# Patient Record
Sex: FEMALE | Race: OTHER
[De-identification: names, ages, dates, MRNs, and addresses within clinical notes are randomized per-mention and may not be internally consistent; named-entity substitution may affect disease eponyms.]

---

## 2018-09-11 ENCOUNTER — HOSPITAL ENCOUNTER (EMERGENCY)
Dept: HOSPITAL 74 - JERFT | Age: 38
Discharge: HOME | End: 2018-09-11
Payer: COMMERCIAL

## 2018-09-11 VITALS — TEMPERATURE: 98 F | SYSTOLIC BLOOD PRESSURE: 98 MMHG | HEART RATE: 61 BPM | DIASTOLIC BLOOD PRESSURE: 61 MMHG

## 2018-09-11 VITALS — BODY MASS INDEX: 25.1 KG/M2
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
## 2018-09-11 DIAGNOSIS — S39.012A: Primary | ICD-10-CM
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
## 2018-09-11 NOTE — PDOC
History of Present Illness





- General


Chief Complaint: Back Pain


Stated Complaint: FALL, PAIN IN LOWER BACK


Time Seen by Provider: 09/11/18 12:59





- History of Present Illness


Initial Comments: 


38-year-old female with lower back pain after a slip on wet leaves this 

morning. She states she fell directly onto her buttocks. She complains of lower 

back pain. She denies radicular symptoms or loss of bowel bladder function.


09/11/18 14:43








Past History





- Past Medical History


Allergies/Adverse Reactions: 


 Allergies











Allergy/AdvReac Type Severity Reaction Status Date / Time


 


No Known Allergies Allergy   Verified 09/11/18 12:36











Home Medications: 


Ambulatory Orders





Ranitidine [Zantac -] 150 mg PO DAILY #10 tablet 02/14/16 


Cyclobenzaprine HCl [Flexeril 10 mg] 10 mg PO HS PRN #10 tablet 09/11/18 


Ibuprofen [Motrin -] 600 mg PO TID #30 tablet 09/11/18 











- Surgical History


Cholecystectomy: Yes





- Immunization History


Immunization Up to Date: Yes





- Suicide/Smoking/Psychosocial Hx


Smoking History: Never smoked


Have you smoked in the past 12 months: No


Information on smoking cessation initiated: No


Hx Alcohol Use: No


Drug/Substance Use Hx: No


Substance Use Type: None





**Review of Systems





- Review of Systems


Musculoskeletal: Yes: Back Pain


All Other Systems: Reviewed and Negative





*Physical Exam





- Vital Signs


 Last Vital Signs











Temp Pulse Resp BP Pulse Ox


 


 98.0 F   61   16   98/61   99 


 


 09/11/18 12:33  09/11/18 12:33  09/11/18 12:33  09/11/18 12:33  09/11/18 12:33














- Physical Exam


Comments: 


Lumbar spine skin color and temperature are normal there is mild to moderate 

left-sided paralumbar musculature spasm and tenderness. 5 out of 5 strength in 

bilateral lower extremities negative straight leg raise test thighs and calves 

are soft and nontender she has no gross sensorimotor deficits she is 

neurovascularly intact.


09/11/18 14:47








ED Treatment Course





- ADDITIONAL ORDERS


Additional order review: 


 Laboratory  Results











  09/11/18





  13:06


 


Urine HCG, Qual  Negative














- RADIOLOGY


Radiology Studies Ordered: 














 Category Date Time Status


 


 SPINE-LUMBAR SACRAL [RAD] Stat Radiology  09/11/18 14:22 Taken














- Medications


Given in the ED: 


ED Medications














Discontinued Medications














Generic Name Dose Route Start Last Admin





  Trade Name Freq  PRN Reason Stop Dose Admin


 


Ketorolac Tromethamine  60 mg  09/11/18 14:37  09/11/18 14:43





  Toradol Injection -  IM  09/11/18 14:38  60 mg





  ONCE ONE   Administration





     





     





     





     














*DC/Admit/Observation/Transfer


Diagnosis at time of Disposition: 


 Lumbar strain








- Discharge Dispostion


Disposition: HOME


Condition at time of disposition: Improved


Decision to Admit order: No





- Referrals


Referrals: 


Mary Wright MD [Primary Care Provider] - 


Missael Keller MD [Staff Physician] - 





- Patient Instructions


Printed Discharge Instructions:  Low Back Pain, DI for Low Back Pain


Additional Instructions: 


Return to the emergency room should symptoms worsen or go unresolved. Follow-up 

with spine surgery in 2-3 days for further evaluation and treatment options. 

Take the anti-inflammatory medication as directed 3 times a day with food that 

one tablet at a time. Discontinue the medication of bothers her stomach. The 

muscle relaxer will make you sleepy 20 tablet before bedtime.





- Post Discharge Activity

## 2018-10-28 ENCOUNTER — HOSPITAL ENCOUNTER (EMERGENCY)
Dept: HOSPITAL 74 - JER | Age: 38
Discharge: HOME | End: 2018-10-28
Payer: COMMERCIAL

## 2018-10-28 VITALS — TEMPERATURE: 98.4 F | HEART RATE: 60 BPM | SYSTOLIC BLOOD PRESSURE: 103 MMHG | DIASTOLIC BLOOD PRESSURE: 62 MMHG

## 2018-10-28 VITALS — BODY MASS INDEX: 24.7 KG/M2

## 2018-10-28 DIAGNOSIS — Z3A.01: ICD-10-CM

## 2018-10-28 DIAGNOSIS — O20.8: ICD-10-CM

## 2018-10-28 DIAGNOSIS — O26.891: Primary | ICD-10-CM

## 2018-10-28 LAB
ALBUMIN SERPL-MCNC: 3.8 G/DL (ref 3.4–5)
ALP SERPL-CCNC: 84 U/L (ref 45–117)
ALT SERPL-CCNC: 41 U/L (ref 13–61)
ANION GAP SERPL CALC-SCNC: 10 MMOL/L (ref 8–16)
APPEARANCE UR: CLEAR
AST SERPL-CCNC: 18 U/L (ref 15–37)
BASOPHILS # BLD: 0.4 % (ref 0–2)
BILIRUB SERPL-MCNC: 0.5 MG/DL (ref 0.2–1)
BILIRUB UR STRIP.AUTO-MCNC: NEGATIVE MG/DL
BUN SERPL-MCNC: 12 MG/DL (ref 7–18)
CALCIUM SERPL-MCNC: 8.3 MG/DL (ref 8.5–10.1)
CHLORIDE SERPL-SCNC: 109 MMOL/L (ref 98–107)
CO2 SERPL-SCNC: 25 MMOL/L (ref 21–32)
COLOR UR: YELLOW
CREAT SERPL-MCNC: 0.5 MG/DL (ref 0.55–1.3)
DEPRECATED RDW RBC AUTO: 13 % (ref 11.6–15.6)
EOSINOPHIL # BLD: 1.4 % (ref 0–4.5)
EPITH CASTS URNS QL MICRO: (no result) /HPF
GLUCOSE SERPL-MCNC: 94 MG/DL (ref 74–106)
HCT VFR BLD CALC: 38.3 % (ref 32.4–45.2)
HGB BLD-MCNC: 12.9 GM/DL (ref 10.7–15.3)
KETONES UR QL STRIP: NEGATIVE
LEUKOCYTE ESTERASE UR QL STRIP.AUTO: NEGATIVE
LYMPHOCYTES # BLD: 27.4 % (ref 8–40)
MCH RBC QN AUTO: 31.5 PG (ref 25.7–33.7)
MCHC RBC AUTO-ENTMCNC: 33.8 G/DL (ref 32–36)
MCV RBC: 93.1 FL (ref 80–96)
MONOCYTES # BLD AUTO: 7.6 % (ref 3.8–10.2)
MUCOUS THREADS URNS QL MICRO: (no result)
NEUTROPHILS # BLD: 63.2 % (ref 42.8–82.8)
NITRITE UR QL STRIP: NEGATIVE
PH UR: 5 [PH] (ref 5–8)
PLATELET # BLD AUTO: 285 K/MM3 (ref 134–434)
PMV BLD: 6.9 FL (ref 7.5–11.1)
POTASSIUM SERPLBLD-SCNC: 3.5 MMOL/L (ref 3.5–5.1)
PROT SERPL-MCNC: 6.8 G/DL (ref 6.4–8.2)
PROT UR QL STRIP: NEGATIVE
PROT UR QL STRIP: NEGATIVE
RBC # BLD AUTO: 4.11 M/MM3 (ref 3.6–5.2)
SODIUM SERPL-SCNC: 144 MMOL/L (ref 136–145)
SP GR UR: 1.01 (ref 1.01–1.03)
UROBILINOGEN UR STRIP-MCNC: NEGATIVE MG/DL (ref 0.2–1)
WBC # BLD AUTO: 6.7 K/MM3 (ref 4–10)

## 2018-10-28 NOTE — PDOC
History of Present Illness





- General


Chief Complaint: Vaginal Bleeding


Stated Complaint: 5 WKS PREGNANT/ VAGINAL BLEEDING


Time Seen by Provider: 10/28/18 08:58


History Source: Patient


Exam Limitations: No Limitations





- History of Present Illness


Initial Comments: 





10/28/18 10:14


37 y/o female  approx 5 weeks pregnant presents to the ED with complaints 

of vaginal bleeding upon urination this morning. The patient also complains of 

mild abdominal cramping for ther past week and has her 1st ultrasound with her 

ob /gyn tomorrow. The patient denies nausea, fever, back pain, or urinary 

complaints.


Timing/Duration: reports: constant


Quality: reports: mild, cramping


Abdominal Pain Onset Location: reports: suprapubic (mid)


Pain Radiation: reports: no radiation


Activities at Onset: reports: none


Aggravating Factors: improves with: None


Alleviating Factors: improves with: None





Past History





- Travel


Traveled outside of the country in the last 30 days: No





- Past Medical History


Allergies/Adverse Reactions: 


 Allergies











Allergy/AdvReac Type Severity Reaction Status Date / Time


 


No Known Allergies Allergy   Verified 10/28/18 08:53











Home Medications: 


Ambulatory Orders





Prenatal One Tablet 1 tab PO DAILY 10/28/18 








COPD: No





- Surgical History


Cholecystectomy: Yes





- Immunization History


Immunization Up to Date: Yes





- Suicide/Smoking/Psychosocial Hx


Smoking History: Never smoked


Have you smoked in the past 12 months: No


Hx Alcohol Use: No


Drug/Substance Use Hx: No


Substance Use Type: None


Patient Lives Alone: No


Lives with/in: spouse/SO





**Review of Systems





- Review of Systems


Able to Perform ROS?: No


Constitutional: No: Symptoms Reported


HEENTM: No: Symptoms Reported


Respiratory: No: Symptoms reported


Cardiac (ROS): No: Symptoms Reported


ABD/GI: Yes: Abdominal cramping


: Yes: Discharge


Musculoskeletal: No: Symptoms Reported


Integumentary: No: Symptoms Reported


Neurological: No: Symptoms reported


Endocrine: No: Symptoms Reported





*Physical Exam





- Vital Signs


 Last Vital Signs











Temp Pulse Resp BP Pulse Ox


 


 98.4 F   60   18   103/62   99 


 


 10/28/18 08:50  10/28/18 08:50  10/28/18 08:50  10/28/18 08:50  10/28/18 08:50














- Physical Exam


General Appearance: Yes: Nourished, Appropriately Dressed.  No: Apparent 

Distress


HEENT: negative: Pale Conjunctivae


Neck: positive: Normal Thyroid


Respiratory/Chest: positive: Lungs Clear, Normal Breath Sounds.  negative: 

Respiratory Distress, Accessory Muscle Use


Cardiovascular: positive: Regular Rhythm, Regular Rate.  negative: Murmur


Female Pelvic Exam: positive: cervical os closed, vaginal bleeding (bright red 

blod no clots).  negative: adnexal tenderness


Gastrointestinal/Abdominal: positive: Soft, Tenderness (mid suprapubic and 

right suprapubic)


Musculoskeletal: negative: CVA Tenderness


Extremity: positive: Normal Capillary Refill.  negative: Pedal Edema


Integumentary: positive: Normal Color, Warm, Moist


Neurologic: positive: Motor Strength 5/5 (ambulatory)





ED Treatment Course





- LABORATORY


CBC & Chemistry Diagram: 


 10/28/18 09:16





 10/28/18 09:16





Medical Decision Making





- Medical Decision Making





10/28/18 10:00


CC: vag bleeding , 5 weeks pregnant, no confirmed IUP, no hx of ectopic, SAB


Exam: mid/right  suprapubic tenderness with BRB in vault


Plan: Labs, urine, u/s, offered tylenol but refused


10/28/18 10:33


 Laboratory Tests











  10/28/18 10/28/18 10/28/18





  09:16 09:16 09:16


 


WBC  6.7  


 


Hgb  12.9  


 


Hct  38.3  


 


Sodium    144


 


Potassium    3.5


 


Chloride    109 H


 


Carbon Dioxide    25


 


Anion Gap    10


 


BUN    12


 


Creatinine    0.5 L


 


Random Glucose    94


 


Calcium    8.3 L


 


Total Bilirubin    0.5


 


AST    18


 


ALT    41


 


Alkaline Phosphatase    84


 


Total Protein    6.8


 


Albumin    3.8


 


Beta HCG, Quant    190.1


 


Urine Blood   3+ H 


 


Urine Nitrite   Negative 


 


Ur Leukocyte Esterase   Negative 


 


Urine WBC (Auto)   48 


 


Urine RBC (Auto)   59 


 


Blood Type   


 


Antibody Screen   














  10/28/18





  09:16


 


WBC 


 


Hgb 


 


Hct 


 


Sodium 


 


Potassium 


 


Chloride 


 


Carbon Dioxide 


 


Anion Gap 


 


BUN 


 


Creatinine 


 


Random Glucose 


 


Calcium 


 


Total Bilirubin 


 


AST 


 


ALT 


 


Alkaline Phosphatase 


 


Total Protein 


 


Albumin 


 


Beta HCG, Quant 


 


Urine Blood 


 


Urine Nitrite 


 


Ur Leukocyte Esterase 


 


Urine WBC (Auto) 


 


Urine RBC (Auto) 


 


Blood Type  Pending


 


Antibody Screen  Pending





 Laboratory Tests











  10/28/18 10/28/18





  09:16 10:40


 


Blood Type  O POSITIVE  O POSITIVE


 


Antibody Screen  Negative 








Ultrasound showed no iup or adnexal masses. Recommend repeat labs/ ultrasound 

for correlation. 





10/28/18 12:15








*DC/Admit/Observation/Transfer


Diagnosis at time of Disposition: 


 Vaginal bleeding in pregnancy








- Discharge Dispostion


Disposition: HOME


Condition at time of disposition: Good





- Referrals


Referrals: 


Mary Wright MD [Primary Care Provider] - 





- Patient Instructions


Printed Discharge Instructions:  DI for Vaginal Bleeding During Pregnancy


Additional Instructions: 


Please follow up with your ob/gyn in 2 days or return here for repeat beta hcg 

and ultrasound. 


May take tylenol 650mg for cramping





- Post Discharge Activity

## 2019-04-22 ENCOUNTER — HOSPITAL ENCOUNTER (EMERGENCY)
Dept: HOSPITAL 74 - JERFT | Age: 39
Discharge: HOME | End: 2019-04-22
Payer: COMMERCIAL

## 2019-04-22 VITALS — HEART RATE: 65 BPM | SYSTOLIC BLOOD PRESSURE: 110 MMHG | TEMPERATURE: 98.1 F | DIASTOLIC BLOOD PRESSURE: 69 MMHG

## 2019-04-22 VITALS — BODY MASS INDEX: 26.4 KG/M2

## 2019-04-22 DIAGNOSIS — M72.2: Primary | ICD-10-CM

## 2019-04-22 NOTE — PDOC
Rapid Medical Evaluation


Chief Complaint: Pain, Acute


Time Seen by Provider: 04/22/19 15:55


Medical Evaluation: 


 Allergies











Allergy/AdvReac Type Severity Reaction Status Date / Time


 


No Known Allergies Allergy   Verified 04/22/19 15:51








Vital Signs











Temp Pulse Resp BP Pulse Ox


 


 98.1 F   65   18   110/69   98 


 


 04/22/19 15:51  04/22/19 15:51  04/22/19 15:51  04/22/19 15:51  04/22/19 15:51








04/22/19 15:56








I have performed a brief in-person evaluation of this patient.





The patient presents with a chief complaint of:L foot pain x 1 week. No trauma. 

Denies pmhx





Pertinent physical exam findings:deferred to ED provider





I have ordered the following:nothing





The patient will proceed to the ED for further evaluation.








**Discharge Disposition





- Diagnosis


 Foot pain, left








- Referrals





- Patient Instructions





- Post Discharge Activity

## 2019-04-22 NOTE — PDOC
History of Present Illness





- General


Chief Complaint: Pain, Acute


Stated Complaint: LT. FOOT PAIN


Time Seen by Provider: 19 15:55


History Source: Patient


Exam Limitations: No Limitations





- History of Present Illness


Initial Comments: 





19 16:36


Patient here with complaints of approximately one week of left foot pain. 

States onset was when she got out of bed last week and had tenderness to the 

bottom/sole of her foot. Denies any recent trauma, denies any changes in foot 

wear, works as a  therefore performing heavy lifting and lots of 

walking frequently. Took Motrin with minimal resolved.


19 16:41





Occurred: reports: last week


Severity: reports: mild, moderate


Pain Location: reports: lower extremity (left foot, plantar aspect)


Modifying Factors: improves with: cold therapy


Associated Symptoms (Fall): denies symptoms





Past History





- Travel


Traveled outside of the country in the last 30 days: No


Close contact w/someone who was outside of country & ill: No





- Past Medical History


Allergies/Adverse Reactions: 


 Allergies











Allergy/AdvReac Type Severity Reaction Status Date / Time


 


No Known Allergies Allergy   Verified 19 15:51











Home Medications: 


Ambulatory Orders





NK [No Known Home Medication]  19 








COPD: No





- Surgical History


Abdominal Surgery: Yes


Cholecystectomy: Yes





- Reproductive History


Cervical CA: No


Dysfunctional Uterine Bleeding: No


Ectopic Pregnancy: No


Endometrial CA: No


Polycystic Ovaries: No


Therapeutic (s) & number: No


Tubal Ligation: No





- Immunization History


Immunization Up to Date: Yes





- Suicide/Smoking/Psychosocial Hx


Smoking History: Never smoked


Have you smoked in the past 12 months: No


Hx Alcohol Use: No


Drug/Substance Use Hx: No


Substance Use Type: None





**Review of Systems





- Review of Systems


Able to Perform ROS?: Yes


Is the patient limited English proficient: Yes


Constitutional: Yes: Symptoms Reported, See HPI, Malaise


HEENTM: No: Symptoms Reported


All Other Systems: Reviewed and Negative





*Physical Exam





- Vital Signs


 Last Vital Signs











Temp Pulse Resp BP Pulse Ox


 


 98.1 F   65   18   110/69   98 


 


 19 15:51  19 15:51  19 15:51  19 15:51  19 15:51














- Physical Exam


General Appearance: Yes: Nourished, Appropriately Dressed, Apparent Distress, 

Mild Distress


HEENT: positive: MATTY, Normal ENT Inspection, TMs Normal, Pharynx Normal


Extremity: positive: Other (tenderness along the plantar fascia beginning 

insertion along the lateral aspect to the metatarsal insertions. Worsen with 

plantar dorsiflexion. Neurovascular intact to toes, no heel, malleoli or and 

redness. No swelling)


Integumentary: positive: Dry, Warm, Pale


Neurologic: positive: CNs II-XII NML intact, Fully Oriented, Normal Response, 

Motor Strength 5/5





Progress Note





- Progress Note


Progress Note: 





Plantar fasciitis, will treat with anti-inflammatories and have follow-up





*DC/Admit/Observation/Transfer


Diagnosis at time of Disposition: 


 Plantar fasciitis of left foot








- Discharge Dispostion


Disposition: HOME


Condition at time of disposition: Stable


Decision to Admit order: No





- Referrals


Referrals: 


Mary Wright MD [Primary Care Provider] - 





- Patient Instructions


Printed Discharge Instructions:  DI for Plantar Fasciitis


Additional Instructions: 


Rest, ice to area on and off for 15 minutes 4-6 times a day


Avoid heavy lifting or exercise until pain and swelling is resolved or until 

further directed


Keep area highly elevated to reduce swelling


Wear supportive shoes/tennis shoes-sneakers 


Freeze water in a Coca-Cola bottle, and gently roll along bottom of foot to 

assist with icing the area


Use splints/Ace wrap as directed


Followup with orthopedist in one to 2 days if not improving, 


    if significantly improved may wait one week for followup with orthopedist





May consider alternative modalities including physical therapy, acupuncture or 

pressure, naturopathic rubs like Arnica creams





May use ibuprofen 2-200 mg tablets every 6 hours as needed for pain





- Post Discharge Activity


Forms/Work/School Notes:  Back to Work

## 2019-07-16 ENCOUNTER — HOSPITAL ENCOUNTER (EMERGENCY)
Dept: HOSPITAL 74 - JER | Age: 39
Discharge: HOME | End: 2019-07-16
Payer: COMMERCIAL

## 2019-07-16 VITALS — HEART RATE: 69 BPM | SYSTOLIC BLOOD PRESSURE: 106 MMHG | TEMPERATURE: 98.3 F | DIASTOLIC BLOOD PRESSURE: 66 MMHG

## 2019-07-16 VITALS — BODY MASS INDEX: 25.4 KG/M2

## 2019-07-16 DIAGNOSIS — O23.11: ICD-10-CM

## 2019-07-16 DIAGNOSIS — N30.01: ICD-10-CM

## 2019-07-16 DIAGNOSIS — Z3A.08: ICD-10-CM

## 2019-07-16 DIAGNOSIS — O26.891: Primary | ICD-10-CM

## 2019-07-16 LAB
APPEARANCE UR: CLEAR
BACTERIA # UR AUTO: 253.2 /HPF
BILIRUB UR STRIP.AUTO-MCNC: NEGATIVE MG/DL
CASTS URNS QL MICRO: 8 /LPF (ref 0–8)
COLOR UR: YELLOW
EPITH CASTS URNS QL MICRO: 5.5 /HPF
KETONES UR QL STRIP: NEGATIVE
LEUKOCYTE ESTERASE UR QL STRIP.AUTO: (no result)
NITRITE UR QL STRIP: NEGATIVE
PH UR: 8.5 [PH] (ref 5–8)
PROT UR QL STRIP: (no result)
PROT UR QL STRIP: NEGATIVE
RBC # BLD AUTO: 26 /HPF (ref 0–4)
SP GR UR: 1.02 (ref 1.01–1.03)
UROBILINOGEN UR STRIP-MCNC: 0.2 MG/DL (ref 0.2–1)
WBC # UR AUTO: 6 /HPF (ref 0–5)

## 2019-07-16 NOTE — PDOC
Rapid Medical Evaluation


Time Seen by Provider: 19 16:22


Medical Evaluation: 


 Allergies











Allergy/AdvReac Type Severity Reaction Status Date / Time


 


No Known Allergies Allergy   Verified 19 15:51











19 16:22





I have performed a brief in-person evaluation of this patient.





The patient presents with a chief complaint of: 8 weeks pregnant w/ vag bleed 

and dysuria.  (s/p 1 spon AB), scheduled for initial US in over a week





Pertinent physical exam findings:Stable and in NAD





I have ordered the following:T&S/beta/UA/US





The patient will proceed to the ED for further evaluation











**Discharge Disposition





- Diagnosis


 Vaginal bleeding in pregnancy








- Referrals





- Patient Instructions





- Post Discharge Activity

## 2019-07-16 NOTE — PDOC
History of Present Illness





- General


History Source: Patient


Exam Limitations: No Limitations





<Steph Grossman - Last Filed: 19 19:09>





<Esmer Sebastian - Last Filed: 19 10:06>





- General


Chief Complaint: Vaginal Bleeding


Stated Complaint: 8 WKS PREGNANT/VAGINAL BLEEDING


Time Seen by Provider: 19 16:22





Past History





- Travel


Traveled outside of the country in the last 30 days: No


Close contact w/someone who was outside of country & ill: No





- Past Medical History


COPD: No





- Surgical History


Abdominal Surgery: Yes


Cholecystectomy: Yes





- Reproductive History


Cervical CA: No


Dysfunctional Uterine Bleeding: No


Ectopic Pregnancy: No


Endometrial CA: No


Polycystic Ovaries: No


Therapeutic (s) & number: No


Tubal Ligation: No





- Immunization History


Immunization Up to Date: Yes





- Suicide/Smoking/Psychosocial Hx


Smoking History: Never smoked


Have you smoked in the past 12 months: No


Information on smoking cessation initiated: No


Hx Alcohol Use: No


Drug/Substance Use Hx: No


Substance Use Type: None





<Steph Grossman - Last Filed: 19 19:09>





<Esmer Sebastian - Last Filed: 19 10:06>





- Past Medical History


Allergies/Adverse Reactions: 


 Allergies











Allergy/AdvReac Type Severity Reaction Status Date / Time


 


No Known Allergies Allergy   Verified 19 15:06











Home Medications: 


Ambulatory Orders





Cephalexin Monohydrate [Keflex -] 500 mg PO BID #14 capsule 19 











**Review of Systems





- Review of Systems


Able to Perform ROS?: Yes


Comments:: 





19 18:46


CONSTITUTIONAL: 


Absent: fever, chills, diaphoresis, generalized weakness, malaise, loss of 

appetite


HEENT: 


Absent: rhinorrhea, nasal congestion, throat pain, throat swelling, difficulty 

swallowing, mouth swelling, ear pain, eye pain, visual Changes


CARDIOVASCULAR: 


Absent: chest pain, loss of consciousness, palpitations, irregular heart rate, 

peripheral edema


RESPIRATORY: 


Absent: cough, shortness of breath, dyspnea with exertion, orthopnea, wheezing, 

stridor, hemoptysis


GASTROINTESTINAL:


Absent: abdominal pain, abdominal distension, nausea, vomiting, diarrhea, 

constipation, melena, hematochezia


GENITOURINARY: 


Present: frequency, dysuria, vaginal bleeding Absent:  urgency, hesitancy, 

hematuria, flank pain, genital pain


MUSCULOSKELETAL: 


Absent: myalgia, arthralgia, joint swelling


SKIN: 


Absent: rash, itching, pallor


HEMATOLOGIC/IMMUNOLOGIC: 


Absent: easy bleeding, easy bruising, lymphadenopathy, frequent infections


ENDOCRINE:


Absent: unexplained weight gain, unexplained weight loss, heat intolerance, 

cold intolerance


NEUROLOGIC: 


Absent: headache, focal weakness or paresthesias, dizziness, unsteady gait, 

seizure, mental status changes, bladder or bowel incontinence


PSYCHIATRIC: 


Absent: anxiety, depression, suicidal or homicidal ideation, hallucinations.


Is the patient limited English proficient: No





<ChaunceySteph - Last Filed: 19 19:09>





*Physical Exam





- Vital Signs


 Last Vital Signs











Temp Pulse Resp BP Pulse Ox


 


 98.3 F   69   17   106/66   98 


 


 19 16:24  19 16:24  19 16:24  19 16:24  19 16:24














- Physical Exam


Comments: 





19 18:48


GENERAL:


Well developed, well nourished. Awake and alert. No acute distress.


HEENT:


Normocephalic, atraumatic. PERRLA, EOMI. No conjunctival pallor. Sclera are non-

icteric. Moist mucous membranes. Oropharynx is clear.


NECK: 


Supple. Full ROM. No JVD. Carotid pulses 2+ and symmetric, without bruits. No 

thyromegaly. No lymphadenopathy.


CARDIOVASCULAR:


Regular rate and rhythm. No murmurs, rubs, or gallops. Distal pulses are 2+ and 

symmetric. 


PULMONARY: 


No evidence of respiratory distress. Lungs clear to auscultation bilaterally. 

No wheezing, rales or rhonchi.


ABDOMINAL:


Suprapubic discomfort. Soft. Non-tender. Non-distended. No rebound or guarding. 

No organomegaly. Normoactive bowel sounds. 


MUSCULOSKELETAL 


Normal range of motion at all joints. No bony deformities or tenderness. No CVA 

tenderness.


EXTREMITIES: 


No cyanosis. No clubbing. No edema. No calf tenderness.


SKIN: 


Warm and dry. Normal capillary refill. No rashes. No jaundice. 


NEUROLOGICAL: 


Alert, awake, appropriate. Cranial nerves 2-12 intact. No deficits to light 

touch and temperature in face, upper extremities and lower extremities. No 

motor deficits in the in face, upper extremities and lower extremities. 

Normoreflexic in the upper and lower extremities. Normal speech. Toes are down-

going bilaterally. Gait is normal without ataxia.


PSYCHIATRIC: 


Cooperative. Good eye contact. Appropriate mood and affect.





<Steph Grossman - Last Filed: 19 19:09>





- Vital Signs


 Last Vital Signs











Temp Pulse Resp BP Pulse Ox


 


 98.3 F   69   17   106/66   98 


 


 19 16:24  19 16:24  19 16:24  19 16:24  19 16:24














<Esmer Sebastina - Last Filed: 19 10:06>





ED Treatment Course





- ADDITIONAL ORDERS


Additional order review: 














 19 17:15 Urine Culture - Preliminary





 Urine - Urine Clean Catch    Pending Organism














<Esmer Sebastian - Last Filed: 19 10:06>





Medical Decision Making





- Medical Decision Making





19 18:49


The patient is a 39-year-old female,  A1 currently 8 weeks pregnant by 

dates who presents to the ER today for reported vaginal bleeding. She states 

that when she wakes she notices some darkish red blood on the toilet paper. 

Denies clots. She states that when she peas it feels itchy and that she is 

going more frequently than usual. She started bleeding this morning. Denies 

fevers, chills, nausea, vomiting, abdominal pain, constipation, diarrhea.





A/P: Vaginal bleed


Type and screen, urine and transvaginal ultrasound ordered at this time


Urine appears to show UTI with 1+ leukocytes, 6 red blood cells in the setting 

of pregnancy we'll treat


Patient is old positive blood type


Transvaginal ultrasound shows a gestational sac at 5 weeks 1 day. No heartbeat 

at this time however still early pregnancy


Beta 1300


Suspect that this is bleeding from a UTI. We'll treat with Keflex


DC home with instructions to f/u in two days for repeat beta and us


I discussed the physical exam findings, ancillary test results and final 

diagnoses with the patient. I answered all of the patient's questions. The 

patient was satisfied with the care received and felt comfortable with the 

discharge plan and treatment plan.  The Patient agrees to follow up with the 

primary care physician/specialist within 24-72 hours. Return precautions were 

given.





<Steph Grossman - Last Filed: 19 19:09>





*DC/Admit/Observation/Transfer





- Discharge Dispostion


Decision to Admit order: No





<Steph Grossman - Last Filed: 19 19:09>





- Attestations


Physician Attestion: 





I reviewed the case with the mid-level practitioner and agree with the mid-

level practitioner's assessment, diagnosis and disposition.








<RomuloEsmer - Last Filed: 19 10:06>


Diagnosis at time of Disposition: 


 Vaginal bleeding in pregnancy





UTI (urinary tract infection)


Qualifiers:


 Urinary tract infection type: acute cystitis Hematuria presence: with 

hematuria Qualified Code(s): N30.01 - Acute cystitis with hematuria








- Discharge Dispostion


Disposition: HOME


Condition at time of disposition: Stable





- Prescriptions


Prescriptions: 


Cephalexin Monohydrate [Keflex -] 500 mg PO BID #14 capsule





- Referrals


Referrals: 


Mikey Tapia MD [Staff Physician] - 





- Patient Instructions


Printed Discharge Instructions:  DI for Urinary Tract Infection (UTI), DI for 

Vaginal Bleeding During Pregnancy


Additional Instructions: 


You have a urinary tract infection. This caused by bacteria.


Please drink plenty of fluids.


Take your antibiotics as prescribed. Finish the entire dose even if you feel 

better.


You may take Tylenol as needed for pain. follow the dosing instruction on the 

bottle


Return in 2 days to the ER for repeat blood work and ultrasound





Return to the emergency department if you have fevers, chills, nausea, vomiting

, back pain, or have any changes in your symptoms.





Tiene leonidas infeccin del tracto urinario. [place] es causado por las bacterias.


Por favor, leyla muchos lquidos.


[place] juany antibiticos segn lo prescrito. Termine la dosis completa incluso si 

se siente mejor.


Puede genaro Tylenol segn sea necesario para el dolor. Seguir las instrucciones 

de dosificacin en la botella.


Regrese en 2 aleman a la michael de emergencias para repetir el anlisis de angela y 

la ecografa.





Regrese al departamento de emergencias si tiene fiebre, escalofros, nuseas, 

vmitos, dolor de espalda o si tiene algn cambio en juany sntomas.


Print Language: American





- Post Discharge Activity


Forms/Work/School Notes:  Back to Work

## 2019-07-18 ENCOUNTER — HOSPITAL ENCOUNTER (EMERGENCY)
Dept: HOSPITAL 74 - JER | Age: 39
Discharge: HOME | End: 2019-07-18
Payer: COMMERCIAL

## 2019-07-18 VITALS — HEART RATE: 64 BPM | TEMPERATURE: 98.2 F | DIASTOLIC BLOOD PRESSURE: 68 MMHG | SYSTOLIC BLOOD PRESSURE: 105 MMHG

## 2019-07-18 VITALS — BODY MASS INDEX: 25.4 KG/M2

## 2019-07-18 DIAGNOSIS — Z3A.01: ICD-10-CM

## 2019-07-18 DIAGNOSIS — O26.891: Primary | ICD-10-CM

## 2019-07-18 DIAGNOSIS — O20.0: ICD-10-CM

## 2019-07-18 LAB
APPEARANCE UR: CLEAR
BACTERIA # UR AUTO: 11.7 /HPF
BILIRUB UR STRIP.AUTO-MCNC: NEGATIVE MG/DL
CASTS URNS QL MICRO: 0 /LPF (ref 0–8)
COLOR UR: YELLOW
EPITH CASTS URNS QL MICRO: 0.5 /HPF
KETONES UR QL STRIP: NEGATIVE
LEUKOCYTE ESTERASE UR QL STRIP.AUTO: NEGATIVE
NITRITE UR QL STRIP: NEGATIVE
PH UR: 6.5 [PH] (ref 5–8)
PROT UR QL STRIP: NEGATIVE
PROT UR QL STRIP: NEGATIVE
RBC # BLD AUTO: 20 /HPF (ref 0–4)
SP GR UR: 1.01 (ref 1.01–1.03)
UROBILINOGEN UR STRIP-MCNC: 0.2 MG/DL (ref 0.2–1)
WBC # UR AUTO: 0 /HPF (ref 0–5)

## 2019-07-18 NOTE — PDOC
Rapid Medical Evaluation


Chief Complaint: Revisit, Lab Variance


Time Seen by Provider: 07/18/19 15:03


Medical Evaluation: 


 Allergies











Allergy/AdvReac Type Severity Reaction Status Date / Time


 


No Known Allergies Allergy   Verified 07/16/19 16:23











07/18/19 15:04


I have performed a brief in-person evaluation of this patient. 


The patient presents with a chief complaint of: told to return today from 

Tuesday to have repeat labs and US - was well 


Pertinent physical exam findings: 


I have ordered the following: 


The patient will proceed to the ED for further evaluation.

## 2019-07-18 NOTE — PDOC
History of Present Illness





- General


Chief Complaint: Vaginal Bleeding


Stated Complaint: REVISIT


Time Seen by Provider: 19 15:03





- History of Present Illness


Initial Comments: 





19 17:01


CHIEF COMPLAINT: vaginal bleeding





HISTORY OF PRESENT ILLNESS: 38 yo A1 F returns to ED with vaginal bleeding 

for repeat beta hCG and US.  Patient states she is pregnant and began bleeding 

2 days ago and came to the ER that day for evaluation.  She reports that she is 

still bleeding but only when she wipes after urination.  She says she uses a 

pad but there's never any blood on it. LMP 5. Pt has not established care 

with OB yet. 





No recent travel or sick contacts. 





PAST MEDICAL HISTORY: Denies past medical history





FAMILY HISTORY: Denies





SOCIAL HISTORY:Denies tobacco, alcohol, illicit drug use. 





SURGICAL HISTORY: Denies





ALLERGIES: No known drug allergies





REVIEW OF SYSTEMS


General/Constitutional: Denies fever or chills. Denies weakness, weight change.





HEENT: Denies change in vision. Denies ear pain or discharge. Denies sore 

throat.





Cardiovascular: Denies chest pain or shortness of breath.





Respiratory: Denies cough, wheezing, or hemoptysis.





Gastrointestinal: Denies nausea, vomiting, diarrhea or constipation. Denies 

rectal bleeding.





Genitourinary: Scant vaginal bleeding x 2 days. Denies dysuria, frequency, or 

change in urination.





Musculoskeletal: Denies joint or muscle swelling or pain. Denies neck or back 

pain.





Skin and breasts: Denies rash or easy bruising.





Neurologic: Denies headache, vertigo, loss of consciousness, or loss of 

sensation.








PHYSICAL EXAM


General Appearance: Well-appearing, appropriately dressed.  No apparent distress

, no intoxication.





HEENT: EOMI, PERRLA, normal ENT inspection, normal voice, TMs normal, pharynx 

normal.  No conjunctival pallor.  No photophobia, scleral icterus.





Neck: Supple.  Trachea midline. No tenderness, rigidity, carotid bruit, stridor

, lymphadenopathy, or thyromegaly. 





Respiratory/Chest: Lungs CTAB.  No shortness of breath, chest tenderness, 

respiratory distress, accessory muscle use. No crackles, rales, rhonchi, stridor

, wheezing, dullness





Cardiovascular: RRR. S1, S2.  No JVD, murmur, bradycardia, tachycardia.





Vascular Pulses: Dorsalis-Pedis (R): 2+, Dorsalis-Pedis (L): 2+





Gastrointestinal/Abdominal: Normal bowel sounds.  Abdomen soft, non-distended.  

No tenderness or rebound tenderness. No  organomegaly, pulsatile mass, guarding

, hernia, hepatomegaly, splenomegaly.





Lymphatic: No adenopathy, tenderness.





Musculoskeletal/Extremities:  Normal inspection. FROM of all extremities, 

normal capillary refill.  Pelvis Stable.  No CVA tenderness. No tenderness to 

extremities, pedal edema, swelling, erythema or deformity.





Integumentary: Appropriate color, dry, warm.  No cyanosis, erythema, jaundice 

or rash





Neurologic: CNs II-XII intact. Fully oriented, alert.  Appropriate mood/affect. 

Motor strength 5/5.  No appreciable EOM palsy, facial droop or sensory deficit.








Past History





- Past Medical History


Allergies/Adverse Reactions: 


 Allergies











Allergy/AdvReac Type Severity Reaction Status Date / Time


 


No Known Allergies Allergy   Verified 19 15:06











Home Medications: 


Ambulatory Orders





Cephalexin Monohydrate [Keflex -] 500 mg PO BID #14 capsule 19 








COPD: No





- Surgical History


Abdominal Surgery: Yes


Cholecystectomy: Yes





- Reproductive History


 (#): 5


Para: 3


Cervical CA: No


Dysfunctional Uterine Bleeding: No


Ectopic Pregnancy: No


Endometrial CA: No


Polycystic Ovaries: No


Therapeutic (s) & number: No


Tubal Ligation: No


Spontaneous : 1





- Immunization History


Immunization Up to Date: Yes





- Suicide/Smoking/Psychosocial Hx


Smoking History: Never smoked


Have you smoked in the past 12 months: No


Hx Alcohol Use: No


Drug/Substance Use Hx: No


Substance Use Type: None





*Physical Exam





- Vital Signs


 Last Vital Signs











Temp Pulse Resp BP Pulse Ox


 


 98.5 F   61   18   108/66   99 


 


 19 15:04  19 15:04  19 15:04  19 15:04  19 15:04














ED Treatment Course





- ADDITIONAL ORDERS


Additional order review: 


 Laboratory  Results











  19





  15:38


 


Urine Color  Yellow


 


Urine Appearance  Clear


 


Urine pH  6.5  D


 


Ur Specific Gravity  1.014


 


Urine Protein  Negative


 


Urine Glucose (UA)  Negative


 


Urine Ketones  Negative


 


Urine Blood  2+ H


 


Urine Nitrite  Negative


 


Urine Bilirubin  Negative


 


Urine Urobilinogen  0.2


 


Ur Leukocyte Esterase  Negative


 


Urine WBC (Auto)  0


 


Urine RBC (Auto)  20


 


Urine Casts (Auto)  0


 


U Epithel Cells (Auto)  0.5


 


Urine Bacteria (Auto)  11.7














Medical Decision Making





- Medical Decision Making





19 17:09


38 yo A1 F returns to ED with vaginal bleeding for repeat beta hCG and US.





-beta hcg


-US





ultrasound shows interval development of previous yolk sac seen two days prior.

  awaiting hcg. 








19 18:19


beta trending down from 2 days ago, likely miscarrying.  





Discussed findings with patient and that she will likely have increased vaginal 

bleeding until the miscarriage has completed.  Advised patient to f/u with OB 

in one week for continued monitoring/evaluation.  Advised patient of signs and 

symptoms for return to ER; patient verbalized understanding and agrees to plan. 





*DC/Admit/Observation/Transfer


Diagnosis at time of Disposition: 


 Threatened miscarriage








- Discharge Dispostion


Disposition: HOME


Condition at time of disposition: Stable


Decision to Admit order: No





- Referrals


Referrals: 


Rafaela Lee MD [Staff Physician] - 


Jose Ricks MD [Staff Physician] - 


Mary Manzo DO [Staff Physician] - 





- Patient Instructions


Printed Discharge Instructions:  DI for Miscarriage





- Post Discharge Activity


Forms/Work/School Notes:  Back to Work

## 2019-07-18 NOTE — PDOC
*Physical Exam





- Vital Signs


 Last Vital Signs











Temp Pulse Resp BP Pulse Ox


 


 98.5 F   61   18   108/66   99 


 


 19 15:04  19 15:04  19 15:04  19 15:04  19 15:04














ED Treatment Course





- ADDITIONAL ORDERS


Additional order review: 


 Laboratory  Results











  19





  15:38


 


Urine Color  Yellow


 


Urine Appearance  Clear


 


Urine pH  6.5  D


 


Ur Specific Gravity  1.014


 


Urine Protein  Negative


 


Urine Glucose (UA)  Negative


 


Urine Ketones  Negative


 


Urine Blood  2+ H


 


Urine Nitrite  Negative


 


Urine Bilirubin  Negative


 


Urine Urobilinogen  0.2


 


Ur Leukocyte Esterase  Negative


 


Urine WBC (Auto)  0


 


Urine RBC (Auto)  20


 


Urine Casts (Auto)  0


 


U Epithel Cells (Auto)  0.5


 


Urine Bacteria (Auto)  11.7














Medical Decision Making





- Medical Decision Making





19 16:26


40 yo A1 F returns to ED with vaginal bleeding for repeat beta hCG and US.  


Vaginal Bleeding x 2 days (not saturating any pads) 


Pt seen by Midlevel Provider under my direct supervision


Ancillary studies reviewed





I agree with plan as outlined by Midlevel Provider


19 17:29


 Laboratory Tests











  19





  15:38 15:38


 


Beta HCG, Quant  1358.9 


 


Urine Blood   2+ H


 


Urine Nitrite   Negative


 


Ur Leukocyte Esterase   Negative











19 17:29


 Laboratory Tests











  19





  17:15


 


Blood Type  O POSITIVE











19 17:30


BHCG has not doubled


US pending


Gestational sac 5 weeks 2 days





clinical impression: threatened AB, repeat presentation





*DC/Admit/Observation/Transfer


Diagnosis at time of Disposition: 


 Threatened miscarriage








- Discharge Dispostion


Disposition: HOME


Condition at time of disposition: Stable





- Referrals


Referrals: 


Jose Ricks MD [Staff Physician] - 


Mary Manzo DO [Staff Physician] - 


Rafaela Lee MD [Staff Physician] - 





- Patient Instructions


Printed Discharge Instructions:  DI for Miscarriage





- Post Discharge Activity


Forms/Work/School Notes:  Back to Work